# Patient Record
Sex: MALE | Employment: OTHER | URBAN - METROPOLITAN AREA
[De-identification: names, ages, dates, MRNs, and addresses within clinical notes are randomized per-mention and may not be internally consistent; named-entity substitution may affect disease eponyms.]

---

## 2017-06-28 ENCOUNTER — OFFICE VISIT (OUTPATIENT)
Dept: URGENT CARE | Facility: URGENT CARE | Age: 27
End: 2017-06-28
Payer: COMMERCIAL

## 2017-06-28 VITALS
TEMPERATURE: 98.3 F | HEART RATE: 61 BPM | BODY MASS INDEX: 32.73 KG/M2 | WEIGHT: 214.2 LBS | DIASTOLIC BLOOD PRESSURE: 73 MMHG | SYSTOLIC BLOOD PRESSURE: 114 MMHG | OXYGEN SATURATION: 96 %

## 2017-06-28 DIAGNOSIS — S61.217A LACERATION OF LEFT LITTLE FINGER WITHOUT FOREIGN BODY WITHOUT DAMAGE TO NAIL, INITIAL ENCOUNTER: Primary | ICD-10-CM

## 2017-06-28 PROCEDURE — 99213 OFFICE O/P EST LOW 20 MIN: CPT | Performed by: NURSE PRACTITIONER

## 2017-06-28 ASSESSMENT — PAIN SCALES - GENERAL: PAINLEVEL: NO PAIN (0)

## 2017-06-28 NOTE — PATIENT INSTRUCTIONS
Laceration: Skin Adhesive  A laceration is a cut through the skin. You have a laceration that your healthcare provider has closed with skin adhesive, a type of skin glue.  Home care  You may take acetaminophen or ibuprofen for pain, unless your provider prescribed another pain medicine.  If you have chronic liver or kidney disease or ever had a stomach ulcer or gastrointestinal bleeding, talk with your healthcare provider before using these medicines.  General care    Keep the wound clean and dry. You may shower or bathe as usual, but do not use soaps, lotions, or ointments on the wound area. Do not scrub the wound. After bathing, pat the wound dry with a soft towel.    Do not scratch, rub, or pick at the adhesive film. Do not place tape directly over the film.    Do not apply liquids (such as peroxide), ointments, or creams to the wound while the film is in place.    Most skin wounds heal without problems. However, an infection sometimes occurs despite proper treatment. Therefore, watch for the signs of infection listed below.  Follow-up care  Follow up with your healthcare provider, or as advised. The adhesive film will fall off in 5 to 7 days.  When to seek medical advice  Call your healthcare provider right away if any of these occur:    Signs of infection:    Fever of 100.4 F (38 C) or higher, or as advised by your healthcare provider    Increasing pain in the wound    Increasing redness or swelling    Pus coming from the wound    Wound bleeds more than a small amount or bleeding doesn t stop    Glue comes off earlier than expected and the wound edges come apart    You feel numbness or weakness in the wound area that doesn t go away  Date Last Reviewed: 6/1/2016 2000-2017 [employer]. [address] [place] [address], [place] [address]. All rights reserved. This information is not intended as a substitute for professional medical care. Always follow your healthcare professional's instructions.

## 2017-06-28 NOTE — MR AVS SNAPSHOT
After Visit Summary   6/28/2017    Abisai Sherwood    MRN: [id number]           Patient Information     Date Of Birth          1990        Visit Information        Provider Department      6/28/2017 12:15 PM [facility] URGENT C[facility]        Care Instructions      Laceration: Skin Adhesive  A laceration is a cut through the skin. You have a laceration that your healthcare provider has closed with skin adhesive, a type of skin glue.  Home care  You may take acetaminophen or ibuprofen for pain, unless your provider prescribed another pain medicine.  If you have chronic liver or kidney disease or ever had a stomach ulcer or gastrointestinal bleeding, talk with your healthcare provider before using these medicines.  General care    Keep the wound clean and dry. You may shower or bathe as usual, but do not use soaps, lotions, or ointments on the wound area. Do not scrub the wound. After bathing, pat the wound dry with a soft towel.    Do not scratch, rub, or pick at the adhesive film. Do not place tape directly over the film.    Do not apply liquids (such as peroxide), ointments, or creams to the wound while the film is in place.    Most skin wounds heal without problems. However, an infection sometimes occurs despite proper treatment. Therefore, watch for the signs of infection listed below.  Follow-up care  Follow up with your healthcare provider, or as advised. The adhesive film will fall off in 5 to 7 days.  When to seek medical advice  Call your healthcare provider right away if any of these occur:    Signs of infection:    Fever of 100.4 F (38 C) or higher, or as advised by your healthcare provider    Increasing pain in the wound    Increasing redness or swelling    Pus coming from the wound    Wound bleeds more than a small amount or bleeding doesn t stop    Glue comes off earlier than expected and the wound edges come apart    You feel numbness or weakness in  "the wound area that doesn t go away  Date Last Reviewed: 2016-2017 [employer] [address] [place] [address], [place] [address]. All rights reserved. This information is not intended as a substitute for professional medical care. Always follow your healthcare professional's instructions.                Follow-ups after your visit        Who to contact     If you have questions or need follow up information about today's clinic visit or your schedule please contact [facility] directly at [phone].  Normal or non-critical lab and imaging results will be communicated to you by [employer], letter or phone within 4 business days after the clinic has received the results. If you do not hear from us within 7 days, please contact the clinic through [employer] or phone. If you have a critical or abnormal lab result, we will notify you by phone as soon as possible.  Submit refill requests through [employer] or call your pharmacy and they will forward the refill request to us. Please allow 3 business days for your refill to be completed.          Additional Information About Your Visit        [employer] Information     [employer] lets you send messages to your doctor, view your test results, renew your prescriptions, schedule appointments and more. To sign up, go to [email or web address] . Click on \"Log in\" on the left side of the screen, which will take you to the Welcome page. Then click on \"Sign up Now\" on the right side of the page.     You will be asked to enter the access code listed below, as well as some personal information. Please follow the directions to create your username and password.     Your access code is: [email or web address]  Expires: 2017 12:47 PM     Your access code will  in 90 days. If you need help or a new code, please call your [facility] or [phone].        Care EveryWhere ID     This is your Care EveryWhere ID. This could be used by other organizations to " access your [facility] medical records  [id number]        Your Vitals Were     Pulse Temperature Pulse Oximetry BMI (Body Mass Index)          61 98.3  F (36.8  C) (Oral) 96% 32.73 kg/m2         Blood Pressure from Last 3 Encounters:   06/28/17 114/73   05/24/16 118/64   06/13/15 104/71    Weight from Last 3 Encounters:   06/28/17 214 lb 3.2 oz (97.2 kg)   05/24/16 215 lb 6.4 oz (97.7 kg)   06/13/15 212 lb (96.2 kg)              Today, you had the following     No orders found for display       Primary Care Provider    M[facility] Other Clinic                Equal Access to Services     [facility] LIVE : Rajat Chaudhry, stephanie atkins, kait dewitt, jennifer quintero . So [facility] [phone].    ATENCIÓN: Si habla español, tiene a viera disposición servicios gratuitos de asistencia lingüística. Llame al [phone].    We comply with applicable federal civil rights laws and [place] laws. We do not discriminate on the basis of race, color, national origin, age, disability sex, sexual orientation or gender identity.            Thank you!     Thank you for choosing [facility]  for your care. Our goal is always to provide you with excellent care. Hearing back from our patients is one way we can continue to improve our services. Please take a few minutes to complete the written survey that you may receive in the mail after your visit with us. Thank you!             Your Updated Medication List - Protect others around you: Learn how to safely use, store and throw away your medicines at www.disposemymeds.org.      Notice  As of 6/28/2017 12:47 PM    You have not been prescribed any medications.

## 2020-02-25 ENCOUNTER — OFFICE VISIT (OUTPATIENT)
Dept: URGENT CARE | Facility: URGENT CARE | Age: 30
End: 2020-02-25
Payer: COMMERCIAL

## 2020-02-25 VITALS
HEART RATE: 92 BPM | OXYGEN SATURATION: 98 % | DIASTOLIC BLOOD PRESSURE: 68 MMHG | SYSTOLIC BLOOD PRESSURE: 115 MMHG | TEMPERATURE: 99.3 F

## 2020-02-25 DIAGNOSIS — R50.9 FEVER, UNSPECIFIED FEVER CAUSE: ICD-10-CM

## 2020-02-25 DIAGNOSIS — J20.8 ACUTE VIRAL BRONCHITIS: Primary | ICD-10-CM

## 2020-02-25 DIAGNOSIS — R07.0 THROAT PAIN: ICD-10-CM

## 2020-02-25 LAB
FLUAV+FLUBV AG SPEC QL: NEGATIVE
FLUAV+FLUBV AG SPEC QL: NEGATIVE
SPECIMEN SOURCE: NORMAL

## 2020-02-25 PROCEDURE — 87651 STREP A DNA AMP PROBE: CPT | Performed by: PHYSICIAN ASSISTANT

## 2020-02-25 PROCEDURE — 87804 INFLUENZA ASSAY W/OPTIC: CPT | Performed by: PHYSICIAN ASSISTANT

## 2020-02-25 PROCEDURE — 99213 OFFICE O/P EST LOW 20 MIN: CPT | Performed by: PHYSICIAN ASSISTANT

## 2020-02-25 PROCEDURE — 40001204 ZZHCL STATISTIC STREP A RAPID: Performed by: PHYSICIAN ASSISTANT

## 2020-02-25 RX ORDER — BENZONATATE 100 MG/1
CAPSULE ORAL
Qty: 20 CAPSULE | Refills: 0 | Status: SHIPPED | OUTPATIENT
Start: 2020-02-25

## 2020-02-25 RX ORDER — CODEINE PHOSPHATE AND GUAIFENESIN 10; 100 MG/5ML; MG/5ML
1-2 SOLUTION ORAL
Qty: 180 ML | Refills: 0 | Status: SHIPPED | OUTPATIENT
Start: 2020-02-25

## 2020-02-25 NOTE — LETTER
February 26, 2020      Abisai Sherwood  [address] [place] [id number]        Dear ,    We are writing to inform you of your test results.    Your test results fall within the expected range(s) or remain unchanged from previous results.  Please continue with current treatment plan.    Resulted Orders   Influenza A/B antigen   Result Value Ref Range    Influenza A/B Agn Specimen Nasal     Influenza A Negative NEG^Negative    Influenza B Negative NEG^Negative      Comment:      Test results must be correlated with clinical data. If necessary, results   should be confirmed by a molecular assay or viral culture.     Streptococcus A Rapid Scr w Reflx to PCR   Result Value Ref Range    Strep Specimen Description Throat     Streptococcus Group A Rapid Screen Negative NEG^Negative      Comment:      No Group A streptococcal antigen detected by immunoassay. Confirmatory testing   in progress.     Group A Streptococcus PCR Throat Swab   Result Value Ref Range    Specimen Description Throat     Strep Group A PCR Not Detected NDET^Not Detected      Comment:      Group A Streptococcus DNA is not detected.  FDA approved assay performed using [employer] GeneXpert real-time PCR.         If you have any questions or concerns, please call the clinic at the number listed above.       Sincerely,        Hazel Perales PA-C

## 2020-02-26 LAB
DEPRECATED S PYO AG THROAT QL EIA: NEGATIVE
SPECIMEN SOURCE: NORMAL
SPECIMEN SOURCE: NORMAL
STREP GROUP A PCR: NOT DETECTED

## 2020-02-26 NOTE — PROGRESS NOTES
S: 29-year-old male presents for evaluation of cough, fever, tiredness for about 5 days.  Cough now seems wet whereas early on it was dry.  Wife wanted him to be tested for influenza.  No vomiting or diarrhea.  No rash.  Tried an over-the-counter cough suppressant without significant relief.      No Known Allergies    PMHX- no asthma  FMHX- adopted    No current outpatient medications on file prior to visit.  No current facility-administered medications on file prior to visit.       Social History     Tobacco Use     Smoking status: Never Smoker     Smokeless tobacco: Never Used   Substance Use Topics     Alcohol use: No       ROS:  CONSTITUTIONAL: Negative for fatigue or fever.  EYES: Negative for eye problems.  ENT: As above.  RESP: As above.  CV: Negative for chest pains.  GI: Negative for vomiting.  MUSCULOSKELETAL:  Negative for significant muscle or joint pains.  NEUROLOGIC: Negative for headaches.  SKIN: Negative for rash.  PSYCH: Normal mentation for age.    OBJECTIVE:  /68   Pulse 92   Temp 99.3  F (37.4  C)   SpO2 98%   GENERAL APPEARANCE: Healthy, alert and no distress.  EYES:Conjunctiva/sclera clear.  EARS: No cerumen.   Ear canals w/o erythema.  TM's intact w/o erythema.    NOSE/MOUTH: Nose without ulcers, erythema or lesions.  SINUSES: No maxillary sinus tenderness.  THROAT: No erythema w/o tonsillar enlargement . No exudates.  NECK: Supple, nontender, no lymphadenopathy.  RESP: Lungs clear to auscultation - no rales, rhonchi or wheezes  CV: Regular rate and rhythm, normal S1 S2, no murmur noted.  NEURO: Awake, alert    SKIN: No rashes  Results for orders placed or performed in visit on 02/25/20   Influenza A/B antigen     Status: None   Result Value Ref Range    Influenza A/B Agn Specimen Nasal     Influenza A Negative NEG^Negative    Influenza B Negative NEG^Negative   Streptococcus A Rapid Scr w Reflx to PCR     Status: None   Result Value Ref Range    Strep Specimen Description Throat      Streptococcus Group A Rapid Screen Negative NEG^Negative           ASSESSMENT:     ICD-10-CM    1. Acute viral bronchitis J20.8 benzonatate (TESSALON PERLES) 100 MG capsule     guaiFENesin-codeine (ROBITUSSIN AC) 100-10 MG/5ML solution   2. Throat pain R07.0 Streptococcus A Rapid Scr w Reflx to PCR   3. Fever, unspecified fever cause R50.9          PLAN: Tessalon Perles.  Robitussin-AC.  Recheck 10 days as needed.  Monitor temp.  Low-grade at this point.  I have discussed clinical findings with patient.  Side effects of medications discussed.  Symptomatic care is discussed.  I have discussed the possibility of  worsening symptoms and indication to RTC or go to the ER if they occur.  All questions are answered, patient indicates understanding of these issues and is in agreement with plan.   Patient care instructions are discussed/given at the end of visit.   Lots of rest and fluids.    Hazel Perales PA-C

## 2021-06-22 NOTE — PROGRESS NOTES
SUBJECTIVE:                                                    Abisai Sherwood is a 27 year old male who presents to clinic today for the following health issues:    Laceration of the left Pinky finger  Onset: 1 hour ago   Symptom: Pulsation         Last tetanus booster within 5 years: yes     No Known Allergies    No past medical history on file.      No current outpatient prescriptions on file prior to visit.  No current facility-administered medications on file prior to visit.       ROS:  SKIN: as above  Musculoskeletal: as above    OBJECTIVE:  Blood pressure 114/73, pulse 61, temperature 98.3  F (36.8  C), temperature source Oral, weight 214 lb 3.2 oz (97.2 kg), SpO2 96 %.     The patient appears today in no acute distress.  Laceration LOCATION: left little finger  Size of laceration: 1 centimeters  Characteristics of the laceration: clean  Tendon function intact: yes  Sensation to light touch intact: yes  Pulses intact: yes  Cap refill intact.  Wound clean. No FBs.      Assessment:    ICD-10-CM    1. Laceration of left little finger without foreign body without damage to nail, initial encounter S61.217A         PLAN:  Oral consent received.  Wound cleaned with saline. Laceration was closed using dermabond.  Dry sterile dressing  applied.    Yenny Lau  FNP-BC  Family Nurse Practitoner             
[FreeTextEntry1] : Declines STD testing/ contraception for now. Will consider.